# Patient Record
Sex: MALE | Race: WHITE | NOT HISPANIC OR LATINO
[De-identification: names, ages, dates, MRNs, and addresses within clinical notes are randomized per-mention and may not be internally consistent; named-entity substitution may affect disease eponyms.]

---

## 2019-06-19 ENCOUNTER — TRANSCRIPTION ENCOUNTER (OUTPATIENT)
Age: 54
End: 2019-06-19

## 2019-06-20 ENCOUNTER — EMERGENCY (EMERGENCY)
Facility: HOSPITAL | Age: 54
LOS: 1 days | Discharge: HOME | End: 2019-06-20
Attending: EMERGENCY MEDICINE
Payer: COMMERCIAL

## 2019-06-20 VITALS
WEIGHT: 182.98 LBS | TEMPERATURE: 98 F | SYSTOLIC BLOOD PRESSURE: 146 MMHG | RESPIRATION RATE: 16 BRPM | HEART RATE: 52 BPM | HEIGHT: 72 IN | OXYGEN SATURATION: 100 % | DIASTOLIC BLOOD PRESSURE: 81 MMHG

## 2019-06-20 VITALS
SYSTOLIC BLOOD PRESSURE: 133 MMHG | DIASTOLIC BLOOD PRESSURE: 77 MMHG | OXYGEN SATURATION: 98 % | TEMPERATURE: 98 F | HEART RATE: 54 BPM | RESPIRATION RATE: 18 BRPM

## 2019-06-20 PROCEDURE — 73130 X-RAY EXAM OF HAND: CPT

## 2019-06-20 PROCEDURE — 99284 EMERGENCY DEPT VISIT MOD MDM: CPT

## 2019-06-20 PROCEDURE — 73110 X-RAY EXAM OF WRIST: CPT

## 2019-06-20 PROCEDURE — 73110 X-RAY EXAM OF WRIST: CPT | Mod: 26,RT

## 2019-06-20 PROCEDURE — 73130 X-RAY EXAM OF HAND: CPT | Mod: 26,RT

## 2019-06-20 RX ORDER — OXYCODONE HYDROCHLORIDE 5 MG/1
1 TABLET ORAL
Qty: 12 | Refills: 0
Start: 2019-06-20 | End: 2019-06-22

## 2019-06-20 RX ORDER — ACETAMINOPHEN 500 MG
975 TABLET ORAL ONCE
Refills: 0 | Status: COMPLETED | OUTPATIENT
Start: 2019-06-20 | End: 2019-06-20

## 2019-06-20 RX ORDER — IBUPROFEN 200 MG
600 TABLET ORAL ONCE
Refills: 0 | Status: COMPLETED | OUTPATIENT
Start: 2019-06-20 | End: 2019-06-20

## 2019-06-20 RX ADMIN — Medication 600 MILLIGRAM(S): at 17:03

## 2019-06-20 RX ADMIN — Medication 975 MILLIGRAM(S): at 17:03

## 2019-06-20 NOTE — ED PROVIDER NOTE - PROGRESS NOTE DETAILS
Patient seen and evaluated by hand who performed bedside reduction and splinting. States pt to call office tomorrow and will be scheduled for surgery by next week. Patient aware. Will send short script oxy for breakthrough pain   Deepthi Tapia PA-C

## 2019-06-20 NOTE — ED PROVIDER NOTE - CLINICAL SUMMARY MEDICAL DECISION MAKING FREE TEXT BOX
Nemes - 52yo M, R hand dominant with no sig pmhx presents to ED c/o hand pain/swelling after hitting against wall 1 hour PTA. Denies numbness, tingling, weakness, other injuries. VS wnl, diffuse edema and deformity to dorsal aspect of the R hand, base of 4th and 5th MC. NV intact, decreased ROM. Likely boxers fx. Will treat pain, apply ice, get Xrays.

## 2019-06-20 NOTE — ED PROVIDER NOTE - CARE PROVIDER_API CALL
Santiago Allen (MD)  Plastic Surgery; Surgery; Surgical Critical Care  [address] [place] [address]  Phone: [phone]  Fax: [phone]  Follow Up Time:

## 2019-06-20 NOTE — ED PROVIDER NOTE - OBJECTIVE STATEMENT
53 year old male right hand dominant with no sig pmhx presents to ED c/o hand pain after hitting against wall 1 hour PTA. Patient states he had immediate pain and swelling to the area. Has been able to range digits 1-5 as well as wrist. Denies numbness, tingling, weakness, other injuries

## 2019-06-20 NOTE — PROGRESS NOTE ADULT - SUBJECTIVE AND OBJECTIVE BOX
Right 4th and 5th carpal-metacarpal fracture/dislocation  Reduced in ER under block  Unstable    Will need Percutaneous pinning  SPlint applied will schedule elective surgery  FU with office tomorrow [phone]

## 2019-06-20 NOTE — ED PROVIDER NOTE - PHYSICAL EXAMINATION
CONSTITUTIONAL: Patient is awake, alert and oriented x 3. Patient is well appearing and in no acute distress.  HEAD: NCAT,  NECK: supple, No LAD  LUNGS: CTA B/L,  HEART: RRR.+S1S2 no murmurs,   ABDOMEN: Soft nd/nt+bs no rebound or guarding.   EXTREMITY: FROM upper and lower ext b/l RIGHT HAND/WRIST: No deformity or ttp wrist. There is deformity with swelling at the base of the 4-5 metacarpals w/ ttp. FROM digits 1-5, 2+ radial pulse, normal gross sensation   SKIN: with no rash or lesions.   NEURO: No focal deficits

## 2019-06-20 NOTE — ED ADULT NURSE NOTE - OBJECTIVE STATEMENT
Male 53 years old came in for right hand pain and swelling after punching the wall today. Denies numbness and tingling of right hand. Radial pulse palpable. Fingers mobile. Waiting for hand Xray.

## 2019-06-20 NOTE — ED PROVIDER NOTE - NSFOLLOWUPINSTRUCTIONS_ED_ALL_ED_FT
1. Rest. Apply cold pack for 20 minutes multiple times daily. Elevate. Keep splint clean, dry, and in place.  2. For pain: Take Motrin 600mg every 6 hours alternated with Tylenol 1g every 6 hours. For severe pain take Oxycodone 1 tab every 6 hours as needed ( This medication causes sedation do not drive when taking)   3. Follow up with Dr. Allen. Call office tomorrow [phone] to schedule elective surgery likely by Wednesday   4. Return to ER for new or worsened symptoms including severe pain, swelling, numbness/tingling, bluish discoloration or any concern.

## 2019-06-20 NOTE — ED ADULT NURSE NOTE - CHPI ED NUR SYMPTOMS NEG
no fever/no numbness/no deformity/no back pain/no bruising/no weakness/no stiffness/no tingling/no abrasion

## 2019-06-24 ENCOUNTER — OUTPATIENT (OUTPATIENT)
Dept: OUTPATIENT SERVICES | Facility: HOSPITAL | Age: 54
LOS: 1 days | End: 2019-06-24
Payer: COMMERCIAL

## 2019-06-24 VITALS
HEART RATE: 37 BPM | DIASTOLIC BLOOD PRESSURE: 82 MMHG | SYSTOLIC BLOOD PRESSURE: 134 MMHG | RESPIRATION RATE: 18 BRPM | TEMPERATURE: 99 F | WEIGHT: 182.98 LBS | HEIGHT: 72 IN | OXYGEN SATURATION: 100 %

## 2019-06-24 DIAGNOSIS — S63.054A DISLOCATION OF OTHER CARPOMETACARPAL JOINT OF RIGHT HAND, INITIAL ENCOUNTER: ICD-10-CM

## 2019-06-24 DIAGNOSIS — S62.109A FRACTURE OF UNSPECIFIED CARPAL BONE, UNSPECIFIED WRIST, INITIAL ENCOUNTER FOR CLOSED FRACTURE: ICD-10-CM

## 2019-06-24 DIAGNOSIS — S62.309A UNSPECIFIED FRACTURE OF UNSPECIFIED METACARPAL BONE, INITIAL ENCOUNTER FOR CLOSED FRACTURE: ICD-10-CM

## 2019-06-24 PROCEDURE — G0463: CPT

## 2019-06-24 PROCEDURE — 93005 ELECTROCARDIOGRAM TRACING: CPT

## 2019-06-24 PROCEDURE — 93010 ELECTROCARDIOGRAM REPORT: CPT

## 2019-06-24 RX ORDER — CEFAZOLIN SODIUM 1 G
2000 VIAL (EA) INJECTION ONCE
Refills: 0 | Status: DISCONTINUED | OUTPATIENT
Start: 2019-06-26 | End: 2019-07-11

## 2019-06-24 RX ORDER — LIDOCAINE HCL 20 MG/ML
0.2 VIAL (ML) INJECTION ONCE
Refills: 0 | Status: DISCONTINUED | OUTPATIENT
Start: 2019-06-26 | End: 2019-07-11

## 2019-06-24 RX ORDER — CHLORHEXIDINE GLUCONATE 213 G/1000ML
1 SOLUTION TOPICAL DAILY
Refills: 0 | Status: DISCONTINUED | OUTPATIENT
Start: 2019-06-26 | End: 2019-07-11

## 2019-06-24 RX ORDER — SODIUM CHLORIDE 9 MG/ML
3 INJECTION INTRAMUSCULAR; INTRAVENOUS; SUBCUTANEOUS EVERY 8 HOURS
Refills: 0 | Status: DISCONTINUED | OUTPATIENT
Start: 2019-06-26 | End: 2019-07-11

## 2019-06-24 NOTE — H&P PST ADULT - ATTENDING COMMENTS
Right 4th and 5th metacarpal carpal dislocations and fractures  Plan for reduction and percutaneous pinning  RBA reviewed including bleeding infection scarring stiffness arthritis malunion nonunion and need for additional procedures  Consent obtained

## 2019-06-24 NOTE — H&P PST ADULT - MUSCULOSKELETAL COMMENTS
splint intact to right hand, +CSM ecchymosis of fingers, +CSM, swelling has come down as per patient

## 2019-06-24 NOTE — H&P PST ADULT - HISTORY OF PRESENT ILLNESS
52 y/o M no significant PMH sustained fracture right 4th and fifth carpal metacarpal fracture after hitting a wall 5 days ago, hand was splinted.  Presents today for closed reduction percutaneous pinning of right 4th and 5th carpal metacarpal fracture.  Of note, patient has asymptomatic bradycardia in the 30's as per baseline, he does "extreme cardio and weight lifting," PMD has comparison EKG to the one done today in PST.

## 2019-06-24 NOTE — H&P PST ADULT - NSICDXPROBLEM_GEN_ALL_CORE_FT
PROBLEM DIAGNOSES  Problem: Carpal fracture  Assessment and Plan: Closed reduction percutaneous pinning right 4th and 5th carpal metacarpal fracture

## 2019-06-24 NOTE — H&P PST ADULT - NSANTHOSAYNRD_GEN_A_CORE
No. ANUJA screening performed.  STOP BANG Legend: 0-2 = LOW Risk; 3-4 = INTERMEDIATE Risk; 5-8 = HIGH Risk

## 2019-06-25 ENCOUNTER — TRANSCRIPTION ENCOUNTER (OUTPATIENT)
Age: 54
End: 2019-06-25

## 2019-06-25 RX ORDER — ONDANSETRON 8 MG/1
4 TABLET, FILM COATED ORAL ONCE
Refills: 0 | Status: DISCONTINUED | OUTPATIENT
Start: 2019-06-26 | End: 2019-07-11

## 2019-06-25 RX ORDER — CELECOXIB 200 MG/1
200 CAPSULE ORAL ONCE
Refills: 0 | Status: DISCONTINUED | OUTPATIENT
Start: 2019-06-26 | End: 2019-07-11

## 2019-06-25 RX ORDER — SODIUM CHLORIDE 9 MG/ML
1000 INJECTION, SOLUTION INTRAVENOUS
Refills: 0 | Status: DISCONTINUED | OUTPATIENT
Start: 2019-06-26 | End: 2019-07-11

## 2019-06-25 RX ORDER — OXYCODONE HYDROCHLORIDE 5 MG/1
5 TABLET ORAL ONCE
Refills: 0 | Status: DISCONTINUED | OUTPATIENT
Start: 2019-06-26 | End: 2019-06-26

## 2019-06-26 ENCOUNTER — OUTPATIENT (OUTPATIENT)
Dept: OUTPATIENT SERVICES | Facility: HOSPITAL | Age: 54
LOS: 1 days | End: 2019-06-26
Payer: COMMERCIAL

## 2019-06-26 VITALS
OXYGEN SATURATION: 98 % | SYSTOLIC BLOOD PRESSURE: 116 MMHG | TEMPERATURE: 99 F | HEIGHT: 72 IN | DIASTOLIC BLOOD PRESSURE: 75 MMHG | HEART RATE: 46 BPM | WEIGHT: 182.98 LBS | RESPIRATION RATE: 16 BRPM

## 2019-06-26 VITALS
DIASTOLIC BLOOD PRESSURE: 56 MMHG | RESPIRATION RATE: 16 BRPM | OXYGEN SATURATION: 100 % | TEMPERATURE: 98 F | SYSTOLIC BLOOD PRESSURE: 116 MMHG | HEART RATE: 44 BPM

## 2019-06-26 DIAGNOSIS — S63.054A DISLOCATION OF OTHER CARPOMETACARPAL JOINT OF RIGHT HAND, INITIAL ENCOUNTER: ICD-10-CM

## 2019-06-26 DIAGNOSIS — S62.309A UNSPECIFIED FRACTURE OF UNSPECIFIED METACARPAL BONE, INITIAL ENCOUNTER FOR CLOSED FRACTURE: ICD-10-CM

## 2019-06-26 PROCEDURE — C1713: CPT

## 2019-06-26 PROCEDURE — 76000 FLUOROSCOPY <1 HR PHYS/QHP: CPT

## 2019-06-26 PROCEDURE — 26608 TREAT METACARPAL FRACTURE: CPT | Mod: F8

## 2019-06-26 RX ORDER — ACETAMINOPHEN 500 MG
1000 TABLET ORAL ONCE
Refills: 0 | Status: COMPLETED | OUTPATIENT
Start: 2019-06-26 | End: 2019-06-26

## 2019-06-26 RX ORDER — CELECOXIB 200 MG/1
200 CAPSULE ORAL ONCE
Refills: 0 | Status: COMPLETED | OUTPATIENT
Start: 2019-06-26 | End: 2019-06-26

## 2019-06-26 RX ADMIN — CELECOXIB 200 MILLIGRAM(S): 200 CAPSULE ORAL at 10:36

## 2019-06-26 RX ADMIN — Medication 1000 MILLIGRAM(S): at 10:35

## 2019-06-26 NOTE — ASU DISCHARGE PLAN (ADULT/PEDIATRIC) - CARE PROVIDER_API CALL
Santiago Allen (MD)  Plastic Surgery; Surgery; Surgical Critical Care  [address] [place] [address]  Phone: [phone]  Fax: [phone]  Follow Up Time:

## 2019-06-26 NOTE — ASU DISCHARGE PLAN (ADULT/PEDIATRIC) - CALL YOUR DOCTOR IF YOU HAVE ANY OF THE FOLLOWING:
Pain not relieved by Medications/Bleeding that does not stop Pain not relieved by Medications/Unable to urinate/Bleeding that does not stop

## 2019-06-26 NOTE — ASU DISCHARGE PLAN (ADULT/PEDIATRIC) - ASU DC SPECIAL INSTRUCTIONSFT
Keep splint in place at all times.  Wrap hand in plastic bag to protect splint and keep it dry while bathing.  Take antibiotics as prescribed.  Take pain medication as needed.     Ensure you keep your right hand elevated.  Please follow up with Dr. Allen within 1 week after discharge from the hospital. You may call [phone] to schedule an appointment.

## 2019-06-26 NOTE — BRIEF OPERATIVE NOTE - NSICDXBRIEFPROCEDURE_GEN_ALL_CORE_FT
PROCEDURES:  Closed reduction, fracture, metacarpal bone, with pinning 26-Jun-2019 12:35:55  Tana Fisher E

## 2021-01-13 PROBLEM — Z86.79 PERSONAL HISTORY OF OTHER DISEASES OF THE CIRCULATORY SYSTEM: Chronic | Status: ACTIVE | Noted: 2019-06-24

## 2021-01-15 ENCOUNTER — APPOINTMENT (OUTPATIENT)
Dept: ORTHOPEDIC SURGERY | Facility: CLINIC | Age: 56
End: 2021-01-15
Payer: COMMERCIAL

## 2021-01-15 VITALS
DIASTOLIC BLOOD PRESSURE: 80 MMHG | WEIGHT: 185 LBS | BODY MASS INDEX: 25.06 KG/M2 | HEART RATE: 53 BPM | HEIGHT: 72 IN | OXYGEN SATURATION: 98 % | SYSTOLIC BLOOD PRESSURE: 131 MMHG

## 2021-01-15 DIAGNOSIS — Z78.9 OTHER SPECIFIED HEALTH STATUS: ICD-10-CM

## 2021-01-15 DIAGNOSIS — M25.511 PAIN IN RIGHT SHOULDER: ICD-10-CM

## 2021-01-15 DIAGNOSIS — M75.41 IMPINGEMENT SYNDROME OF RIGHT SHOULDER: ICD-10-CM

## 2021-01-15 PROBLEM — Z00.00 ENCOUNTER FOR PREVENTIVE HEALTH EXAMINATION: Status: ACTIVE | Noted: 2021-01-15

## 2021-01-15 PROCEDURE — 73030 X-RAY EXAM OF SHOULDER: CPT | Mod: RT

## 2021-01-15 PROCEDURE — 99072 ADDL SUPL MATRL&STAF TM PHE: CPT

## 2021-01-15 PROCEDURE — 99204 OFFICE O/P NEW MOD 45 MIN: CPT

## 2021-01-15 RX ORDER — DICLOFENAC SODIUM 75 MG/1
75 TABLET, DELAYED RELEASE ORAL
Qty: 60 | Refills: 0 | Status: ACTIVE | COMMUNITY
Start: 2021-01-15 | End: 1900-01-01

## 2021-01-15 NOTE — PHYSICAL EXAM
[de-identified] : Physical examination of the right shoulder discloses tenderness to overhead motion at 170 degrees increase with abduction and external rotation.  Positive impingement sign. [de-identified] : X-rays taken of the right shoulder and AP transscapular lateral and axillary views are within normal limits.

## 2021-01-15 NOTE — DISCUSSION/SUMMARY
[de-identified] : The patient was advised of his findings.  He was felt to have an impingement syndrome and was asked to avoid overhead weight lifting and sports until his symptoms subside.  He will start diclofenac and was offered referral to physical therapy and a cortisone injection both of which she declined.  Follow-up as needed

## 2021-01-15 NOTE — HISTORY OF PRESENT ILLNESS
[Worsening] : worsening [___ yrs] : [unfilled] year(s) ago [Intermit.] : ~He/She~ states the symptoms seem to be intermittent [Lifting] : worsened by lifting [Rest] : relieved by rest [5] : a current pain level of 5/10 [0] : a minimum pain level of 0/10 [9] : a maximum pain level of 9/10 [de-identified] : Pt presents for initial evaluation with pain in his right shoulder, pt is right hand dominant. Pt has no known injury, pt has no radiating pain to the upper right extremity. He has not taken any pain medication. Pt has exercised in gym. [de-identified] : certain movements

## 2021-03-12 ENCOUNTER — RESULT REVIEW (OUTPATIENT)
Age: 56
End: 2021-03-12

## 2021-11-11 ENCOUNTER — NON-APPOINTMENT (OUTPATIENT)
Age: 56
End: 2021-11-11

## 2021-11-16 ENCOUNTER — APPOINTMENT (OUTPATIENT)
Dept: CARDIOLOGY | Facility: CLINIC | Age: 56
End: 2021-11-16
Payer: COMMERCIAL

## 2021-11-16 ENCOUNTER — NON-APPOINTMENT (OUTPATIENT)
Age: 56
End: 2021-11-16

## 2021-11-16 VITALS
WEIGHT: 176 LBS | SYSTOLIC BLOOD PRESSURE: 131 MMHG | TEMPERATURE: 96.3 F | RESPIRATION RATE: 12 BRPM | HEART RATE: 44 BPM | BODY MASS INDEX: 23.84 KG/M2 | HEIGHT: 72 IN | OXYGEN SATURATION: 100 % | DIASTOLIC BLOOD PRESSURE: 73 MMHG

## 2021-11-16 PROCEDURE — 99244 OFF/OP CNSLTJ NEW/EST MOD 40: CPT | Mod: 25

## 2021-11-16 PROCEDURE — 93000 ELECTROCARDIOGRAM COMPLETE: CPT

## 2021-11-16 NOTE — PHYSICAL EXAM
[Well Developed] : well developed [Well Nourished] : well nourished [No Acute Distress] : no acute distress [Normal] : normal conjunctiva [Normal Venous Pressure] : normal venous pressure [No Carotid Bruit] : no carotid bruit [Bradycardia] : bradycardic [Rhythm Regular] : regular [Normal S1] : normal S1 [Normal S2] : normal S2 [S3] : no S3 [No Murmur] : no murmurs heard [No Pitting Edema] : no pitting edema present [Right Carotid Bruit] : no bruit heard over the right carotid [Left Carotid Bruit] : no bruit heard over the left carotid [Bruit] : no bruit heard [Clear Lung Fields] : clear lung fields [Good Air Entry] : good air entry [No Respiratory Distress] : no respiratory distress  [Soft] : abdomen soft [Non Tender] : non-tender [Normal Bowel Sounds] : normal bowel sounds [Normal Gait] : normal gait [Gait - Sufficient for Exercise Testing] : gait - sufficient for exercise testing [No Edema] : no edema [No Cyanosis] : no cyanosis [No Clubbing] : no clubbing [No Rash] : no rash [Alert and Oriented] : alert and oriented [Normal memory] : normal memory [de-identified] : Extraocular muscles intact. Anicteric sclerae. [de-identified] : He was wearing a face mask during the examination. [de-identified] : No visible skin ulcers.

## 2021-11-16 NOTE — CARDIOLOGY SUMMARY
[de-identified] : 11/16/2021: Marked Sinus Bradycardia at 42 beats per minute. [de-identified] : Calcium score performed on 9/13/2021: Total calcium score of 428. LAD= 329; LCx/ OM= 99

## 2021-11-16 NOTE — HISTORY OF PRESENT ILLNESS
[FreeTextEntry1] : Patient is a 56 year old man with a family history of CAD and newly diagnosed coronary atherosclerosis who presents today for evaluation of elevated calcium score. He states that he has been feeling well denying any chest pain, dyspnea, palpitations, headaches, and dizziness. He states that he exercises on a daily basis and does not experience any exertional symptoms. He states that he has been doing high intensity exercises for many years. He also states that he used to consume a diet high in protein and is unsure if this contributed to his elevated calcium score.

## 2021-11-16 NOTE — DISCUSSION/SUMMARY
[FreeTextEntry1] : IMPRESSION: Mr. WHALEN is a 56 year old man with a family history of CAD and newly diagnosed coronary atherosclerosis who presents today for evaluation of elevated calcium score. \par \par PLAN:\par 1. He is asymptomatic, however, he has a high calcium score. I will be checking a CMP and lipid profile today. I have asked him to start on ASA 81 mg daily given his coronary atherosclerosis. His goal LDL is less than 70. He will start on Crestor 10 mg daily. I will also be checking a CRP. Apolipoprotein levels, and a Lipoprotein A level.\par 2. I have asked him to schedule an echocardiogram given his abnormal ECG. He is bradycardic, but asymptomatic. His bradycardia may be related to his excellent conditioning that he describes.\par 3. I will try to get a Cardiac CT approved to evaluate the extent of his CAD given his elevated calcium score.\par 4. He will follow up with me after his cardiac tests have been performed.

## 2021-11-17 LAB
BASOPHILS # BLD AUTO: 0.03 K/UL
BASOPHILS NFR BLD AUTO: 0.6 %
EOSINOPHIL # BLD AUTO: 0.11 K/UL
EOSINOPHIL NFR BLD AUTO: 2 %
ESTIMATED AVERAGE GLUCOSE: 97 MG/DL
HBA1C MFR BLD HPLC: 5 %
HCT VFR BLD CALC: 45.4 %
HGB BLD-MCNC: 14.4 G/DL
IMM GRANULOCYTES NFR BLD AUTO: 0.2 %
LYMPHOCYTES # BLD AUTO: 1.64 K/UL
LYMPHOCYTES NFR BLD AUTO: 30.4 %
MAN DIFF?: NORMAL
MCHC RBC-ENTMCNC: 29.3 PG
MCHC RBC-ENTMCNC: 31.7 GM/DL
MCV RBC AUTO: 92.3 FL
MONOCYTES # BLD AUTO: 0.27 K/UL
MONOCYTES NFR BLD AUTO: 5 %
NEUTROPHILS # BLD AUTO: 3.33 K/UL
NEUTROPHILS NFR BLD AUTO: 61.8 %
PLATELET # BLD AUTO: 182 K/UL
RBC # BLD: 4.92 M/UL
RBC # FLD: 13.3 %
WBC # FLD AUTO: 5.39 K/UL

## 2021-11-23 ENCOUNTER — APPOINTMENT (OUTPATIENT)
Dept: CARDIOLOGY | Facility: CLINIC | Age: 56
End: 2021-11-23
Payer: COMMERCIAL

## 2021-11-23 LAB
25(OH)D3 SERPL-MCNC: 38.1 NG/ML
TSH SERPL-ACNC: 3.06 UIU/ML

## 2021-11-23 PROCEDURE — 93306 TTE W/DOPPLER COMPLETE: CPT

## 2021-11-24 LAB
ALBUMIN SERPL ELPH-MCNC: 4.6 G/DL
ALP BLD-CCNC: 59 U/L
ALT SERPL-CCNC: 29 U/L
ANION GAP SERPL CALC-SCNC: 19 MMOL/L
APO A-I SERPL-MCNC: 126 MG/DL
APO A-I/APO B SERPL: 0.53 RATIO
APO B SERPL-MCNC: 66 MG/DL
APO LP(A) SERPL-MCNC: 112.8 NMOL/L
AST SERPL-CCNC: 31 U/L
BILIRUB SERPL-MCNC: 0.4 MG/DL
BUN SERPL-MCNC: 14 MG/DL
CALCIUM SERPL-MCNC: 9.7 MG/DL
CHLORIDE SERPL-SCNC: 102 MMOL/L
CHOLEST SERPL-MCNC: 130 MG/DL
CO2 SERPL-SCNC: 22 MMOL/L
CREAT SERPL-MCNC: 0.94 MG/DL
CRP SERPL HS-MCNC: 0.3 MG/L
GLUCOSE SERPL-MCNC: 83 MG/DL
HDLC SERPL-MCNC: 44 MG/DL
LDLC SERPL CALC-MCNC: 71 MG/DL
NONHDLC SERPL-MCNC: 86 MG/DL
POTASSIUM SERPL-SCNC: 5.5 MMOL/L
PROT SERPL-MCNC: 7 G/DL
SODIUM SERPL-SCNC: 143 MMOL/L
TRIGL SERPL-MCNC: 75 MG/DL

## 2021-12-14 ENCOUNTER — APPOINTMENT (OUTPATIENT)
Dept: CT IMAGING | Facility: CLINIC | Age: 56
End: 2021-12-14

## 2021-12-23 ENCOUNTER — TRANSCRIPTION ENCOUNTER (OUTPATIENT)
Age: 56
End: 2021-12-23

## 2022-01-18 ENCOUNTER — RX RENEWAL (OUTPATIENT)
Age: 57
End: 2022-01-18

## 2022-02-08 ENCOUNTER — NON-APPOINTMENT (OUTPATIENT)
Age: 57
End: 2022-02-08

## 2022-02-08 ENCOUNTER — TRANSCRIPTION ENCOUNTER (OUTPATIENT)
Age: 57
End: 2022-02-08

## 2022-02-08 ENCOUNTER — APPOINTMENT (OUTPATIENT)
Dept: CARDIOLOGY | Facility: CLINIC | Age: 57
End: 2022-02-08
Payer: COMMERCIAL

## 2022-02-08 VITALS
RESPIRATION RATE: 12 BRPM | WEIGHT: 170 LBS | HEART RATE: 42 BPM | OXYGEN SATURATION: 100 % | HEIGHT: 72 IN | SYSTOLIC BLOOD PRESSURE: 136 MMHG | BODY MASS INDEX: 23.03 KG/M2 | DIASTOLIC BLOOD PRESSURE: 77 MMHG | TEMPERATURE: 96.3 F

## 2022-02-08 PROCEDURE — 93000 ELECTROCARDIOGRAM COMPLETE: CPT

## 2022-02-08 PROCEDURE — 99214 OFFICE O/P EST MOD 30 MIN: CPT | Mod: 25

## 2022-02-08 RX ORDER — ASPIRIN ENTERIC COATED TABLETS 81 MG 81 MG/1
81 TABLET, DELAYED RELEASE ORAL
Refills: 0 | Status: ACTIVE | COMMUNITY
Start: 2022-02-08

## 2022-02-09 LAB
ALBUMIN SERPL ELPH-MCNC: 4.6 G/DL
ALP BLD-CCNC: 71 U/L
ALT SERPL-CCNC: 34 U/L
ANION GAP SERPL CALC-SCNC: 10 MMOL/L
AST SERPL-CCNC: 37 U/L
BILIRUB SERPL-MCNC: 0.6 MG/DL
BUN SERPL-MCNC: 13 MG/DL
CALCIUM SERPL-MCNC: 9.5 MG/DL
CHLORIDE SERPL-SCNC: 101 MMOL/L
CHOLEST SERPL-MCNC: 111 MG/DL
CO2 SERPL-SCNC: 28 MMOL/L
CREAT SERPL-MCNC: 0.85 MG/DL
GLUCOSE SERPL-MCNC: 81 MG/DL
HDLC SERPL-MCNC: 44 MG/DL
LDLC SERPL CALC-MCNC: 50 MG/DL
NONHDLC SERPL-MCNC: 67 MG/DL
POTASSIUM SERPL-SCNC: 4.8 MMOL/L
PROT SERPL-MCNC: 7 G/DL
SODIUM SERPL-SCNC: 140 MMOL/L
TRIGL SERPL-MCNC: 83 MG/DL

## 2022-02-13 NOTE — DISCUSSION/SUMMARY
[FreeTextEntry1] : IMPRESSION: Mr. WHALEN is a 56 year old man with a family history of CAD, dyslipidemia, and coronary atherosclerosis who presents today for follow up of hyperlipidemia and CAD. \par \par PLAN:\par 1. He will continue on Crestor 10 mg daily and Aspirin 81 mg daily given his CAD.  I will be checking a CMP and lipid profile today. His goal LDL is less than 70. He will also continue on a low fat/ low cholesterol diet. He had nonobstructive coronary artery disease on his cardiac CT that was performed about 2 months ago. He was bradycardic on his ECG that was performed today, which is relatively unchanged and he is without any symptoms.\par 2. He should have a repeat echocardiogram in about a year to follow up his mitral and tricuspid regurgitation.\par 3. He will follow up with me in 3 months, or sooner if he is symptomatic.\par 4. I spent approximately 34 minutes with the patient during this encounter, including documentation.

## 2022-02-13 NOTE — PHYSICAL EXAM
[Well Developed] : well developed [Well Nourished] : well nourished [No Acute Distress] : no acute distress [Normal Venous Pressure] : normal venous pressure [No Carotid Bruit] : no carotid bruit [Bradycardia] : bradycardic [Rhythm Regular] : regular [Normal S1] : normal S1 [Normal S2] : normal S2 [No Pitting Edema] : no pitting edema present [Clear Lung Fields] : clear lung fields [Good Air Entry] : good air entry [No Respiratory Distress] : no respiratory distress  [Soft] : abdomen soft [Non Tender] : non-tender [Normal Bowel Sounds] : normal bowel sounds [Normal Gait] : normal gait [Gait - Sufficient for Exercise Testing] : gait - sufficient for exercise testing [No Edema] : no edema [No Cyanosis] : no cyanosis [No Rash] : no rash [Alert and Oriented] : alert and oriented [Normal memory] : normal memory [Normal Conjunctiva] : normal conjunctiva [S3] : no S3 [No Murmur] : no murmurs heard [Right Carotid Bruit] : no bruit heard over the right carotid [Left Carotid Bruit] : no bruit heard over the left carotid [Bruit] : no bruit heard [Moves all extremities] : moves all extremities [No Focal Deficits] : no focal deficits [Normal Speech] : normal speech [de-identified] : Extraocular muscles intact. Anicteric sclerae. [de-identified] : He was wearing a face mask during the examination. [de-identified] : No visible skin ulcers.

## 2022-02-13 NOTE — CARDIOLOGY SUMMARY
[de-identified] : 2/8/2022: Marked Sinus Bradycardia at 41 beats per minute.\par  [de-identified] : 11/23/2021: EF 55%. 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 Mild-moderate, eccentric anteriorly-directed mitral regurgitation. Mild aortic regurgitation. Mildly dilated left atrium. Normal LV systolic function. MIld right atrial enlargement. RV enlargement with normal RV systolic function. Borderline pulmonary HTN. PASP = 39 mmHg. Moderate tricuspid regurgitation.\par  [de-identified] : Calcium score performed on 9/13/2021: Total calcium score of 428. LAD= 329; LCx/ OM= 99\par \par Cardiac CT performed on 12/16/2021: Calcium Score = 457. Calcified plaque of the distal left main resulting in 10-15% stenosis. 30-40% nonobstructive plaque in the proximal LAD. Less than 20% stenosis of the distal LAD. Noncalcified plaque at the ostium of the first diagonal resulting in 25-35% stenosis. Small focus of calcified plaque of the proximal LCx resulting in no appreciable stenosis. Calcified plaque of the proximal RCA resulting in less than 10% stenosis. \par

## 2022-02-13 NOTE — HISTORY OF PRESENT ILLNESS
[FreeTextEntry1] : Patient is a 56 year old man with a family history of CAD, dyslipidemia, and coronary atherosclerosis who presents today for follow up of coronary atherosclerosis and dyslipidemia.  He states that he has been feeling well denying any chest pain, dyspnea, palpitations, headaches, and dizziness. He states that he exercises on a daily basis and does not experience any exertional symptoms. He has been watching his diet very closely and eliminating the amount of oils and meat in his diet. He has not experienced any side effects to the Rosuvastatin.

## 2022-05-11 ENCOUNTER — NON-APPOINTMENT (OUTPATIENT)
Age: 57
End: 2022-05-11

## 2022-05-11 ENCOUNTER — APPOINTMENT (OUTPATIENT)
Dept: CARDIOLOGY | Facility: CLINIC | Age: 57
End: 2022-05-11
Payer: COMMERCIAL

## 2022-05-11 VITALS
RESPIRATION RATE: 12 BRPM | SYSTOLIC BLOOD PRESSURE: 119 MMHG | DIASTOLIC BLOOD PRESSURE: 74 MMHG | HEART RATE: 45 BPM | HEIGHT: 72 IN | TEMPERATURE: 98 F | OXYGEN SATURATION: 98 % | BODY MASS INDEX: 23.7 KG/M2 | WEIGHT: 175 LBS

## 2022-05-11 PROCEDURE — 99214 OFFICE O/P EST MOD 30 MIN: CPT | Mod: 25

## 2022-05-11 PROCEDURE — 93000 ELECTROCARDIOGRAM COMPLETE: CPT

## 2022-05-14 NOTE — CARDIOLOGY SUMMARY
[de-identified] : 11/23/2021: EF 55%. 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 Mild-moderate, eccentric anteriorly-directed mitral regurgitation. Mild aortic regurgitation. Mildly dilated left atrium. Normal LV systolic function. MIld right atrial enlargement. RV enlargement with normal RV systolic function. Borderline pulmonary HTN. PASP = 39 mmHg. Moderate tricuspid regurgitation.\par  [de-identified] : 5/11/2022: Marked Sinus Bradycardia at 43 beats per minute, low voltage QRS, and nonspecific T wave abnormality.\par  [de-identified] : Calcium score performed on 9/13/2021: Total calcium score of 428. LAD= 329; LCx/ OM= 99\par \par Cardiac CT performed on 12/16/2021: Calcium Score = 457. Calcified plaque of the distal left main resulting in 10-15% stenosis. 30-40% nonobstructive plaque in the proximal LAD. Less than 20% stenosis of the distal LAD. Noncalcified plaque at the ostium of the first diagonal resulting in 25-35% stenosis. Small focus of calcified plaque of the proximal LCx resulting in no appreciable stenosis. Calcified plaque of the proximal RCA resulting in less than 10% stenosis. \par

## 2022-05-14 NOTE — DISCUSSION/SUMMARY
[FreeTextEntry1] : IMPRESSION: Mr. WHALEN is a 56 year old man with a family history of CAD, dyslipidemia, and coronary atherosclerosis who presents today for follow up of hyperlipidemia and CAD. \par \par PLAN:\par 1. He will continue on Crestor 10 mg daily and Aspirin 81 mg daily given his CAD.  I will be checking a CMP and lipid profile today. His goal LDL is less than 70. He will also continue on a low fat/ low cholesterol diet. He had nonobstructive coronary artery disease on his cardiac CT that was performed about 5 months ago. He was bradycardic on his ECG that was performed today, which is relatively unchanged and he is without any symptoms. I will also be checking his Apolipoproteins and Lipoprotein A level.\par 2. He should have a repeat echocardiogram in about 6 months to follow up his mitral and tricuspid regurgitation.\par 3. He will follow up with me in 4 months, or sooner if he is symptomatic.\par 4. I spent approximately 30 minutes with the patient during this encounter, including documentation.

## 2022-05-14 NOTE — PHYSICAL EXAM
[Well Developed] : well developed [Well Nourished] : well nourished [No Acute Distress] : no acute distress [Normal Conjunctiva] : normal conjunctiva [Normal Venous Pressure] : normal venous pressure [No Carotid Bruit] : no carotid bruit [Bradycardia] : bradycardic [Rhythm Regular] : regular [Normal S1] : normal S1 [Normal S2] : normal S2 [S3] : no S3 [No Murmur] : no murmurs heard [No Pitting Edema] : no pitting edema present [Right Carotid Bruit] : no bruit heard over the right carotid [Left Carotid Bruit] : no bruit heard over the left carotid [Bruit] : no bruit heard [Clear Lung Fields] : clear lung fields [Good Air Entry] : good air entry [No Respiratory Distress] : no respiratory distress  [Soft] : abdomen soft [Non Tender] : non-tender [Normal Bowel Sounds] : normal bowel sounds [Normal Gait] : normal gait [Gait - Sufficient for Exercise Testing] : gait - sufficient for exercise testing [No Edema] : no edema [No Cyanosis] : no cyanosis [No Rash] : no rash [Moves all extremities] : moves all extremities [No Focal Deficits] : no focal deficits [Normal Speech] : normal speech [Alert and Oriented] : alert and oriented [Normal memory] : normal memory [de-identified] : He was wearing a face mask during the examination. [de-identified] : Extraocular muscles intact. Anicteric sclerae. [de-identified] : No visible skin ulcers.

## 2022-05-14 NOTE — HISTORY OF PRESENT ILLNESS
[FreeTextEntry1] : Patient is a 56 year old man with a family history of CAD, dyslipidemia, and coronary atherosclerosis who presents today for follow up of coronary atherosclerosis and dyslipidemia.  He states that he has been feeling well denying any chest pain, dyspnea, palpitations, headaches, and dizziness. He states that he exercises on a daily basis and does not experience any exertional symptoms. He has been watching his diet very closely and has not experienced any side effects from Rosuvastatin.

## 2022-10-11 ENCOUNTER — TRANSCRIPTION ENCOUNTER (OUTPATIENT)
Age: 57
End: 2022-10-11

## 2022-10-12 ENCOUNTER — TRANSCRIPTION ENCOUNTER (OUTPATIENT)
Age: 57
End: 2022-10-12

## 2022-12-01 ENCOUNTER — NON-APPOINTMENT (OUTPATIENT)
Age: 57
End: 2022-12-01

## 2022-12-01 ENCOUNTER — APPOINTMENT (OUTPATIENT)
Dept: CARDIOLOGY | Facility: CLINIC | Age: 57
End: 2022-12-01

## 2022-12-01 VITALS
RESPIRATION RATE: 12 BRPM | BODY MASS INDEX: 23.03 KG/M2 | DIASTOLIC BLOOD PRESSURE: 72 MMHG | SYSTOLIC BLOOD PRESSURE: 119 MMHG | WEIGHT: 170 LBS | OXYGEN SATURATION: 98 % | HEIGHT: 72 IN | HEART RATE: 49 BPM | TEMPERATURE: 97.6 F

## 2022-12-01 LAB
25(OH)D3 SERPL-MCNC: 38.9 NG/ML
ALBUMIN SERPL ELPH-MCNC: 4.3 G/DL
ALP BLD-CCNC: 59 U/L
ALT SERPL-CCNC: 28 U/L
ANION GAP SERPL CALC-SCNC: 10 MMOL/L
APO A-I SERPL-MCNC: 122 MG/DL
APO A-I/APO B SERPL: 0.38 RATIO
APO B SERPL-MCNC: 47 MG/DL
APO LP(A) SERPL-MCNC: 128.3 NMOL/L
AST SERPL-CCNC: 30 U/L
BASOPHILS # BLD AUTO: 0.03 K/UL
BASOPHILS NFR BLD AUTO: 0.5 %
BILIRUB SERPL-MCNC: 0.5 MG/DL
BUN SERPL-MCNC: 15 MG/DL
CALCIUM SERPL-MCNC: 9.1 MG/DL
CHLORIDE SERPL-SCNC: 102 MMOL/L
CHOLEST SERPL-MCNC: 91 MG/DL
CO2 SERPL-SCNC: 28 MMOL/L
CREAT SERPL-MCNC: 0.84 MG/DL
CREAT SPEC-SCNC: 88 MG/DL
EGFR: 102 ML/MIN/1.73M2
EOSINOPHIL # BLD AUTO: 0.13 K/UL
EOSINOPHIL NFR BLD AUTO: 2.1 %
GLUCOSE SERPL-MCNC: 83 MG/DL
HCT VFR BLD CALC: 43 %
HDLC SERPL-MCNC: 40 MG/DL
HGB BLD-MCNC: 13.9 G/DL
IMM GRANULOCYTES NFR BLD AUTO: 0.2 %
LDLC SERPL CALC-MCNC: 39 MG/DL
LYMPHOCYTES # BLD AUTO: 1.84 K/UL
LYMPHOCYTES NFR BLD AUTO: 29.4 %
MAN DIFF?: NORMAL
MCHC RBC-ENTMCNC: 29.6 PG
MCHC RBC-ENTMCNC: 32.3 GM/DL
MCV RBC AUTO: 91.7 FL
MICROALBUMIN 24H UR DL<=1MG/L-MCNC: <1.2 MG/DL
MICROALBUMIN/CREAT 24H UR-RTO: NORMAL MG/G
MONOCYTES # BLD AUTO: 0.29 K/UL
MONOCYTES NFR BLD AUTO: 4.6 %
NEUTROPHILS # BLD AUTO: 3.95 K/UL
NEUTROPHILS NFR BLD AUTO: 63.2 %
NONHDLC SERPL-MCNC: 51 MG/DL
PLATELET # BLD AUTO: 184 K/UL
POTASSIUM SERPL-SCNC: 4.7 MMOL/L
PROT SERPL-MCNC: 6.6 G/DL
RBC # BLD: 4.69 M/UL
RBC # FLD: 13.2 %
SODIUM SERPL-SCNC: 140 MMOL/L
TRIGL SERPL-MCNC: 63 MG/DL
TSH SERPL-ACNC: 3.24 UIU/ML
WBC # FLD AUTO: 6.25 K/UL

## 2022-12-01 PROCEDURE — 93000 ELECTROCARDIOGRAM COMPLETE: CPT

## 2022-12-01 PROCEDURE — 99214 OFFICE O/P EST MOD 30 MIN: CPT | Mod: 25

## 2022-12-02 LAB
25(OH)D3 SERPL-MCNC: 36.8 NG/ML
ALBUMIN SERPL ELPH-MCNC: 4.4 G/DL
ALP BLD-CCNC: 53 U/L
ALT SERPL-CCNC: 28 U/L
ANION GAP SERPL CALC-SCNC: 10 MMOL/L
AST SERPL-CCNC: 30 U/L
BASOPHILS # BLD AUTO: 0.04 K/UL
BASOPHILS NFR BLD AUTO: 0.6 %
BILIRUB SERPL-MCNC: 0.4 MG/DL
BUN SERPL-MCNC: 18 MG/DL
CALCIUM SERPL-MCNC: 9.4 MG/DL
CHLORIDE SERPL-SCNC: 101 MMOL/L
CHOLEST SERPL-MCNC: 108 MG/DL
CO2 SERPL-SCNC: 27 MMOL/L
CREAT SERPL-MCNC: 0.82 MG/DL
EGFR: 102 ML/MIN/1.73M2
EOSINOPHIL # BLD AUTO: 0.13 K/UL
EOSINOPHIL NFR BLD AUTO: 2.1 %
GLUCOSE SERPL-MCNC: 82 MG/DL
HCT VFR BLD CALC: 44.5 %
HDLC SERPL-MCNC: 46 MG/DL
HGB BLD-MCNC: 15 G/DL
IMM GRANULOCYTES NFR BLD AUTO: 0.2 %
LDLC SERPL CALC-MCNC: 45 MG/DL
LYMPHOCYTES # BLD AUTO: 1.66 K/UL
LYMPHOCYTES NFR BLD AUTO: 26.8 %
MAN DIFF?: NORMAL
MCHC RBC-ENTMCNC: 29.8 PG
MCHC RBC-ENTMCNC: 33.7 GM/DL
MCV RBC AUTO: 88.5 FL
MONOCYTES # BLD AUTO: 0.33 K/UL
MONOCYTES NFR BLD AUTO: 5.3 %
NEUTROPHILS # BLD AUTO: 4.03 K/UL
NEUTROPHILS NFR BLD AUTO: 65 %
NONHDLC SERPL-MCNC: 62 MG/DL
PLATELET # BLD AUTO: 184 K/UL
POTASSIUM SERPL-SCNC: 4.6 MMOL/L
PROT SERPL-MCNC: 6.9 G/DL
RBC # BLD: 5.03 M/UL
RBC # FLD: 12.4 %
SODIUM SERPL-SCNC: 138 MMOL/L
TRIGL SERPL-MCNC: 85 MG/DL
TSH SERPL-ACNC: 3.71 UIU/ML
WBC # FLD AUTO: 6.2 K/UL

## 2022-12-02 NOTE — PHYSICAL EXAM
[Well Developed] : well developed [Well Nourished] : well nourished [No Acute Distress] : no acute distress [Normal Conjunctiva] : normal conjunctiva [Normal Venous Pressure] : normal venous pressure [No Carotid Bruit] : no carotid bruit [Bradycardia] : bradycardic [Rhythm Regular] : regular [Normal S1] : normal S1 [Normal S2] : normal S2 [No Pitting Edema] : no pitting edema present [Clear Lung Fields] : clear lung fields [Good Air Entry] : good air entry [No Respiratory Distress] : no respiratory distress  [Soft] : abdomen soft [Non Tender] : non-tender [Normal Bowel Sounds] : normal bowel sounds [Normal Gait] : normal gait [Gait - Sufficient for Exercise Testing] : gait - sufficient for exercise testing [No Edema] : no edema [No Cyanosis] : no cyanosis [No Rash] : no rash [Moves all extremities] : moves all extremities [No Focal Deficits] : no focal deficits [Normal Speech] : normal speech [Alert and Oriented] : alert and oriented [Normal memory] : normal memory [S3] : no S3 [I] : a grade 1 [Right Carotid Bruit] : no bruit heard over the right carotid [Left Carotid Bruit] : no bruit heard over the left carotid [Bruit] : no bruit heard [de-identified] : Extraocular muscles intact. Anicteric sclerae. [de-identified] : He was wearing a face mask during the examination. [de-identified] : No visible skin ulcers.

## 2022-12-02 NOTE — DISCUSSION/SUMMARY
[FreeTextEntry1] : IMPRESSION: Mr. WHALEN is a 57 year old man with a family history of CAD, dyslipidemia, and coronary atherosclerosis who presents today for follow up of hyperlipidemia and CAD. \par \par PLAN:\par 1. He will continue on Crestor 10 mg daily and Aspirin 81 mg daily given his CAD.  I will be checking a CMP and lipid profile today. His goal LDL is less than 70. He will also continue on a low fat/ low cholesterol diet. He had nonobstructive coronary artery disease on his cardiac CT that was performed about 1 year ago. He was bradycardic on his ECG that was performed today, which is relatively unchanged and he is without any symptoms. \par 2. I have asked him to schedule an echocardiogram at the time of his next visit in about 6 months to follow up his mitral and tricuspid regurgitation.\par 3. He will follow up with me in 6 months, or sooner if he is symptomatic. [EKG obtained to assist in diagnosis and management of assessed problem(s)] : EKG obtained to assist in diagnosis and management of assessed problem(s)

## 2022-12-02 NOTE — HISTORY OF PRESENT ILLNESS
[FreeTextEntry1] : Patient is a 57 year old man with a family history of CAD, dyslipidemia, mitral regurgitation, and coronary atherosclerosis who presents today for follow up of coronary atherosclerosis and dyslipidemia.  He states that he has been feeling well denying any chest pain, dyspnea, palpitations, headaches, and dizziness. He states that he exercises on a daily basis and does not experience any exertional symptoms. He has been watching his diet very closely and has not experienced any side effects from Rosuvastatin.

## 2022-12-02 NOTE — CARDIOLOGY SUMMARY
[de-identified] : 12/1/2022: Marked Sinus Bradycardia at 45 beats per minute [de-identified] : 11/23/2021: EF 55%. 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 Mild-moderate, eccentric anteriorly-directed mitral regurgitation. Mild aortic regurgitation. Mildly dilated left atrium. Normal LV systolic function. MIld right atrial enlargement. RV enlargement with normal RV systolic function. Borderline pulmonary HTN. PASP = 39 mmHg. Moderate tricuspid regurgitation.\par  [de-identified] : Calcium score performed on 9/13/2021: Total calcium score of 428. LAD= 329; LCx/ OM= 99\par \par Cardiac CT performed on 12/16/2021: Calcium Score = 457. Calcified plaque of the distal left main resulting in 10-15% stenosis. 30-40% nonobstructive plaque in the proximal LAD. Less than 20% stenosis of the distal LAD. Noncalcified plaque at the ostium of the first diagonal resulting in 25-35% stenosis. Small focus of calcified plaque of the proximal LCx resulting in no appreciable stenosis. Calcified plaque of the proximal RCA resulting in less than 10% stenosis. \par

## 2023-06-01 ENCOUNTER — NON-APPOINTMENT (OUTPATIENT)
Age: 58
End: 2023-06-01

## 2023-06-01 ENCOUNTER — APPOINTMENT (OUTPATIENT)
Dept: CARDIOLOGY | Facility: CLINIC | Age: 58
End: 2023-06-01
Payer: COMMERCIAL

## 2023-06-01 VITALS
TEMPERATURE: 97.7 F | RESPIRATION RATE: 14 BRPM | WEIGHT: 173 LBS | SYSTOLIC BLOOD PRESSURE: 123 MMHG | DIASTOLIC BLOOD PRESSURE: 75 MMHG | HEART RATE: 45 BPM | OXYGEN SATURATION: 99 % | BODY MASS INDEX: 23.46 KG/M2

## 2023-06-01 PROCEDURE — 99214 OFFICE O/P EST MOD 30 MIN: CPT | Mod: 25

## 2023-06-01 PROCEDURE — 93000 ELECTROCARDIOGRAM COMPLETE: CPT

## 2023-06-01 PROCEDURE — 93306 TTE W/DOPPLER COMPLETE: CPT

## 2023-06-02 DIAGNOSIS — E87.5 HYPERKALEMIA: ICD-10-CM

## 2023-06-02 LAB
25(OH)D3 SERPL-MCNC: 41.6 NG/ML
CHOLEST SERPL-MCNC: 127 MG/DL
ESTIMATED AVERAGE GLUCOSE: 105 MG/DL
HBA1C MFR BLD HPLC: 5.3 %
HDLC SERPL-MCNC: 58 MG/DL
LDLC SERPL CALC-MCNC: 54 MG/DL
NONHDLC SERPL-MCNC: 69 MG/DL
TRIGL SERPL-MCNC: 77 MG/DL
TSH SERPL-ACNC: 3.43 UIU/ML

## 2023-06-05 LAB
ALBUMIN SERPL ELPH-MCNC: 4.6 G/DL
ALP BLD-CCNC: 65 U/L
ALT SERPL-CCNC: 32 U/L
ANION GAP SERPL CALC-SCNC: 10 MMOL/L
AST SERPL-CCNC: 31 U/L
BILIRUB SERPL-MCNC: 0.6 MG/DL
BUN SERPL-MCNC: 13 MG/DL
CALCIUM SERPL-MCNC: 9.9 MG/DL
CHLORIDE SERPL-SCNC: 105 MMOL/L
CO2 SERPL-SCNC: 28 MMOL/L
CREAT SERPL-MCNC: 0.94 MG/DL
EGFR: 95 ML/MIN/1.73M2
GLUCOSE SERPL-MCNC: 97 MG/DL
POTASSIUM SERPL-SCNC: 5.8 MMOL/L
PROT SERPL-MCNC: 6.8 G/DL
SODIUM SERPL-SCNC: 143 MMOL/L

## 2023-07-04 ENCOUNTER — NON-APPOINTMENT (OUTPATIENT)
Age: 58
End: 2023-07-04

## 2023-07-04 NOTE — DISCUSSION/SUMMARY
[FreeTextEntry1] : IMPRESSION: Mr. WHALEN is a 57 year old man with a family history of CAD, dyslipidemia, and coronary atherosclerosis who presents today for follow up of hyperlipidemia and CAD. \par \par PLAN:\par 1. He will continue on Crestor 10 mg daily and Aspirin 81 mg daily given his CAD.  I will be checking a CMP and lipid profile today. His goal LDL is less than 70. He will also continue on a low fat/ low cholesterol diet. He had nonobstructive coronary artery disease on his cardiac CT that was performed about a year and a half ago. He was bradycardic on his ECG that was performed today, which is relatively unchanged and he is without any symptoms. I will be checking a CMP and lipid profile today. \par 2. He had an echocardiogram today that showed relatively stable mitral and tricuspid regurgitation.\par 3. He will follow up with me in 6 months, or sooner if he is symptomatic. [EKG obtained to assist in diagnosis and management of assessed problem(s)] : EKG obtained to assist in diagnosis and management of assessed problem(s)

## 2023-07-04 NOTE — HISTORY OF PRESENT ILLNESS
[FreeTextEntry1] : Patient is a 57 year old man with a family history of CAD, dyslipidemia, mitral regurgitation, and coronary atherosclerosis who presents today for follow up of coronary atherosclerosis and dyslipidemia.  He states that he has been feeling well denying any chest pain, dyspnea, palpitations, headaches, and dizziness. He states that he exercises on a daily basis and does not experience any exertional symptoms. \par

## 2023-07-04 NOTE — PHYSICAL EXAM
[Well Developed] : well developed [No Acute Distress] : no acute distress [Well Nourished] : well nourished [Normal Conjunctiva] : normal conjunctiva [Normal Venous Pressure] : normal venous pressure [No Carotid Bruit] : no carotid bruit [Bradycardia] : bradycardic [Rhythm Regular] : regular [Normal S1] : normal S1 [Normal S2] : normal S2 [I] : a grade 1 [Clear Lung Fields] : clear lung fields [Good Air Entry] : good air entry [No Respiratory Distress] : no respiratory distress  [Soft] : abdomen soft [Non Tender] : non-tender [Normal Bowel Sounds] : normal bowel sounds [Normal Gait] : normal gait [Gait - Sufficient for Exercise Testing] : gait - sufficient for exercise testing [No Edema] : no edema [No Cyanosis] : no cyanosis [No Rash] : no rash [Moves all extremities] : moves all extremities [No Focal Deficits] : no focal deficits [Normal Speech] : normal speech [Alert and Oriented] : alert and oriented [Normal memory] : normal memory [S3] : no S3 [No Pitting Edema] : no pitting edema present [Right Carotid Bruit] : no bruit heard over the right carotid [Left Carotid Bruit] : no bruit heard over the left carotid [Bruit] : no bruit heard [de-identified] : Extraocular muscles intact. Anicteric sclerae. [de-identified] : No visible skin ulcers. [de-identified] : Normal oral mucosa.

## 2023-07-04 NOTE — CARDIOLOGY SUMMARY
[de-identified] : 6/1/2023: Marked Sinus Bradycardia at 40 beats per minute\par  [de-identified] : Calcium score performed on 9/13/2021: Total calcium score of 428. LAD= 329; LCx/ OM= 99\par \par Cardiac CT performed on 12/16/2021: Calcium Score = 457. Calcified plaque of the distal left main resulting in 10-15% stenosis. 30-40% nonobstructive plaque in the proximal LAD. Less than 20% stenosis of the distal LAD. Noncalcified plaque at the ostium of the first diagonal resulting in 25-35% stenosis. Small focus of calcified plaque of the proximal LCx resulting in no appreciable stenosis. Calcified plaque of the proximal RCA resulting in less than 10% stenosis. \par  [de-identified] : 11/23/2021: EF 55%. 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 Mild-moderate, eccentric anteriorly-directed mitral regurgitation. Mild aortic regurgitation. Mildly dilated left atrium. Normal LV systolic function. MIld right atrial enlargement. RV enlargement with normal RV systolic function. Borderline pulmonary HTN. PASP = 39 mmHg. Moderate tricuspid regurgitation.\par

## 2023-08-29 ENCOUNTER — TRANSCRIPTION ENCOUNTER (OUTPATIENT)
Age: 58
End: 2023-08-29

## 2023-12-04 ENCOUNTER — APPOINTMENT (OUTPATIENT)
Dept: CARDIOLOGY | Facility: CLINIC | Age: 58
End: 2023-12-04
Payer: COMMERCIAL

## 2023-12-04 ENCOUNTER — NON-APPOINTMENT (OUTPATIENT)
Age: 58
End: 2023-12-04

## 2023-12-04 VITALS
WEIGHT: 176 LBS | HEIGHT: 72 IN | RESPIRATION RATE: 12 BRPM | BODY MASS INDEX: 23.84 KG/M2 | HEART RATE: 53 BPM | OXYGEN SATURATION: 99 % | DIASTOLIC BLOOD PRESSURE: 74 MMHG | SYSTOLIC BLOOD PRESSURE: 132 MMHG

## 2023-12-04 PROCEDURE — 99214 OFFICE O/P EST MOD 30 MIN: CPT | Mod: 25

## 2023-12-04 PROCEDURE — 93000 ELECTROCARDIOGRAM COMPLETE: CPT

## 2023-12-07 DIAGNOSIS — R94.31 ABNORMAL ELECTROCARDIOGRAM [ECG] [EKG]: ICD-10-CM

## 2023-12-07 LAB
25(OH)D3 SERPL-MCNC: 62.5 NG/ML
ALBUMIN SERPL ELPH-MCNC: 4.7 G/DL
ALP BLD-CCNC: 57 U/L
ALT SERPL-CCNC: 19 U/L
ANION GAP SERPL CALC-SCNC: 10 MMOL/L
APO A-I SERPL-MCNC: 164 MG/DL
APO A-I/APO B SERPL: 0.3 RATIO
APO B SERPL-MCNC: 48 MG/DL
APO LP(A) SERPL-MCNC: 134 NMOL/L
APPEARANCE: CLEAR
AST SERPL-CCNC: 27 U/L
BASOPHILS # BLD AUTO: 0.03 K/UL
BASOPHILS NFR BLD AUTO: 0.5 %
BILIRUB SERPL-MCNC: 0.7 MG/DL
BILIRUBIN URINE: NEGATIVE
BLOOD URINE: NEGATIVE
BUN SERPL-MCNC: 12 MG/DL
CALCIUM SERPL-MCNC: 9.7 MG/DL
CHLORIDE SERPL-SCNC: 102 MMOL/L
CHOLEST SERPL-MCNC: 117 MG/DL
CO2 SERPL-SCNC: 29 MMOL/L
COLOR: YELLOW
CREAT SERPL-MCNC: 0.92 MG/DL
CREAT SPEC-SCNC: 109 MG/DL
EGFR: 96 ML/MIN/1.73M2
EOSINOPHIL # BLD AUTO: 0.19 K/UL
EOSINOPHIL NFR BLD AUTO: 3.3 %
ESTIMATED AVERAGE GLUCOSE: 105 MG/DL
FOLATE SERPL-MCNC: 6 NG/ML
GLUCOSE QUALITATIVE U: NEGATIVE MG/DL
GLUCOSE SERPL-MCNC: 97 MG/DL
HBA1C MFR BLD HPLC: 5.3 %
HCT VFR BLD CALC: 47 %
HDLC SERPL-MCNC: 57 MG/DL
HGB BLD-MCNC: 15.4 G/DL
IMM GRANULOCYTES NFR BLD AUTO: 0.2 %
KETONES URINE: NEGATIVE MG/DL
LDLC SERPL CALC-MCNC: 39 MG/DL
LEUKOCYTE ESTERASE URINE: NEGATIVE
LYMPHOCYTES # BLD AUTO: 1.39 K/UL
LYMPHOCYTES NFR BLD AUTO: 23.9 %
MAN DIFF?: NORMAL
MCHC RBC-ENTMCNC: 29 PG
MCHC RBC-ENTMCNC: 32.8 GM/DL
MCV RBC AUTO: 88.5 FL
MICROALBUMIN 24H UR DL<=1MG/L-MCNC: <1.2 MG/DL
MICROALBUMIN/CREAT 24H UR-RTO: NORMAL MG/G
MONOCYTES # BLD AUTO: 0.37 K/UL
MONOCYTES NFR BLD AUTO: 6.4 %
NEUTROPHILS # BLD AUTO: 3.82 K/UL
NEUTROPHILS NFR BLD AUTO: 65.7 %
NITRITE URINE: NEGATIVE
NONHDLC SERPL-MCNC: 60 MG/DL
PH URINE: 7
PLATELET # BLD AUTO: 185 K/UL
POTASSIUM SERPL-SCNC: 5 MMOL/L
PROT SERPL-MCNC: 6.9 G/DL
PROTEIN URINE: NEGATIVE MG/DL
PSA SERPL-MCNC: 1.98 NG/ML
RBC # BLD: 5.31 M/UL
RBC # FLD: 12.8 %
SODIUM SERPL-SCNC: 141 MMOL/L
SPECIFIC GRAVITY URINE: 1.02
TRIGL SERPL-MCNC: 121 MG/DL
TSH SERPL-ACNC: 4.03 UIU/ML
UROBILINOGEN URINE: 0.2 MG/DL
VIT B12 SERPL-MCNC: 639 PG/ML
WBC # FLD AUTO: 5.81 K/UL

## 2024-01-11 NOTE — H&P PST ADULT - VASCULAR
"Physical Therapy Treatment    Patient Name:  Dimitri Johnson   MRN:  [id number]  Admit Date: 12/26/2023  Admitting Diagnosis: Weakness  Recent Surgeries: N/A    General Precautions: Standard, fall, aspiration, vision impaired  Orthopedic Precautions: N/A  Braces: N/A    Recommendations:     Discharge Recommendations: home health PT  Level of Assistance Recommended at Discharge: 24 hours light assistance  Discharge Equipment Recommendations: 3-in-1 commode, wheelchair  Barriers to discharge: Decreased caregiver support    Assessment:     Dimitri Johnson is a 89 y.o. male admitted with a medical diagnosis of Weakness .   Pt was agreeable and tolerated session well. Pt tolerated increase ankle weight on seated therex and cross  with no issues today. Pt required cues for sequencing with all activity with notable anxiousness behavior.  Patient continues to demonstrate the need for low intensity therapy on a scheduled basis exhibited by decreased independence with functional mobility.    Performance deficits affecting function: weakness, gait instability, impaired endurance, impaired balance, decreased safety awareness, pain, impaired self care skills, impaired functional mobility, decreased lower extremity function.    Rehab Potential is good    Activity Tolerance: Good    Plan:     Patient to be seen 5 x/week to address the above listed problems via gait training, therapeutic activities, therapeutic exercises, neuromuscular re-education, wheelchair management/training    Plan of Care Expires: 01/26/24  Plan of Care Reviewed with: patient    Subjective     "I use the stair lift".     Pain/Comfort:  Pain Rating 1: 0/10  Pain Rating Post-Intervention 1: 0/10    Patient's cultural, spiritual, Jew conflicts given the current situation:  no    Objective:     Patient found  up in wheelchair in therapy gym  with  (no lines) upon PT entry.     Therapeutic Activities and Exercises:   Seated BLE therex 2x 10 reps: ankle pumps, " "long arc quads, and marches  Completed with 2# ankle weight  Recumbent cross  x10 mins (lvl 1)  To improve BLE endurance, strength, and ROM  Patient educated on role of therapy, goals of session, and benefits of out of bed mobility.   Instructed on use of call button and importance of calling nursing staff for assistance with mobility   Questions/concerns addressed within PTA scope of practice  Pt verbalized understanding.    Functional Mobility:  Transfers:   Sit <> Stand Transfer: minimum assistance and moderate assistance with rolling walker   Cues to scoot forward, hand placement, and keep feet flat on the floor   Trainer <> Chair Transfer: minimum assistance with no assistive device using Stand Pivot technique  Cues for hand placement and sequencing    Gait:  Pt ambulated ~275+100 ft with contact guard assistance and rolling walker. Wheelchair following   1 rest break and no overt LOB  Gait Deviation(s): flexed posture, decrease nena, downward gaze, and B genu valgum   Verbal/tactile cues for gaze direction and upright posture   Curb:  Pt ascended/descended 4" curb step with Rolling Walker with Contact Guard Assistance.   Cue for sequencing     AM-PAC 6 CLICK MOBILITY  17    Patient left up in chair with call button in reach and PCT notified.    GOALS:   Multidisciplinary Problems       Physical Therapy Goals          Problem: Physical Therapy    Goal Priority Disciplines Outcome Goal Variances Interventions   Physical Therapy Goal     PT, PT/OT Ongoing, Progressing     Description: Goals to be met by: 24     Patient will increase functional independence with mobility by performin. Supine to sit with Supervision  2. Sit to supine with Supervision  3. Rolling to Left and Right with Supervision  4. Sit to stand transfer with Stand-by Assistance  5. Bed to chair transfer with Stand-by Assistance using Rolling Walker  6. Gait  x 150 feet with Stand-by Assistance using Rolling Walker  7. " Wheelchair propulsion x 150 feet with Modified [place] using bilateral upper extremities  8. New Goal 1/8/24: Ascend/descend 4 inch curb step using RW with SBA  9. New Goal 1/8/24: Ascend/descend 4 steps using BHR with SBA (progress number of steps as tolerated)    Rehab Services' DME recommendations    Wheelchair  Number of hours up in a wheelchair per day 8      Style Light weight    Justification for light weight w/c: patient cannot propel in a standard wheelchair, patient can and does self-propel in a lightweight wheelchair, patient has impaired ability to participate in MRADLs, mobility limitations cannot be sifficiently resolved with a cane/walker, the home provides adequate access between rooms for a wheelchair, a wheelchair will significantly improve the ability to participate in MRADLs and will be used in the home on a regular basis, the patient is willing to use a wheelchair in the home, the patient has a caregiver who is available, willing, and able to provide assistance with the wheelchair, and the patient requires additional person for safety with mobility with walker  Seat Width 18 (Standard adult)  Seat Depth 18  Back Height Standard  Leg Support Standard, Heel Loops, and Swing Away  Arm Height Desk and Swing Away  Lap Belt Buckle  Accessories Anti-tippers and Safety belt  Cushion Basic  Justification for Cushion Skin Integrity    Transport Chair    3 in 1 commode Standard     Justification for 3 in 1 commode: The patient's deficits will not be sufficiently addressed by a raised toilet seat                           Time Tracking:     PT Received On: 01/11/24  PT Start Time: 0955  PT Stop Time: 1040  PT Total Time (min): 45 min    Billable Minutes: Gait Training 17, Therapeutic Activity 13, and Therapeutic Exercise 15    Treatment Type: Treatment  PT/PTA: PTA     Number of PTA visits since last PT visit: 4     01/11/2024   Equal and normal pulses (carotid, femoral, dorsalis pedis)

## 2024-02-27 NOTE — ED ADULT TRIAGE NOTE - BMI (KG/M2)
Problem: Pain - Adult  Goal: Verbalizes/displays adequate comfort level or baseline comfort level  Outcome: Progressing     Problem: Safety - Adult  Goal: Free from fall injury  Outcome: Progressing   The patient's goals for the shift include Safety, pain relief    The clinical goals for the shift include Maintain pt safety and comfort; monitor labs/vitals/telemetry         24.8

## 2024-03-05 ENCOUNTER — TRANSCRIPTION ENCOUNTER (OUTPATIENT)
Age: 59
End: 2024-03-05

## 2024-05-13 ENCOUNTER — LABORATORY RESULT (OUTPATIENT)
Age: 59
End: 2024-05-13

## 2024-05-13 ENCOUNTER — NON-APPOINTMENT (OUTPATIENT)
Age: 59
End: 2024-05-13

## 2024-05-13 ENCOUNTER — APPOINTMENT (OUTPATIENT)
Dept: CARDIOLOGY | Facility: CLINIC | Age: 59
End: 2024-05-13
Payer: COMMERCIAL

## 2024-05-13 VITALS
HEART RATE: 41 BPM | OXYGEN SATURATION: 99 % | HEIGHT: 72 IN | SYSTOLIC BLOOD PRESSURE: 135 MMHG | BODY MASS INDEX: 25.06 KG/M2 | DIASTOLIC BLOOD PRESSURE: 83 MMHG | WEIGHT: 185 LBS | RESPIRATION RATE: 16 BRPM

## 2024-05-13 DIAGNOSIS — I05.9 RHEUMATIC MITRAL VALVE DISEASE, UNSPECIFIED: ICD-10-CM

## 2024-05-13 DIAGNOSIS — I25.10 ATHEROSCLEROTIC HEART DISEASE OF NATIVE CORONARY ARTERY W/OUT ANGINA PECTORIS: ICD-10-CM

## 2024-05-13 PROCEDURE — G2211 COMPLEX E/M VISIT ADD ON: CPT

## 2024-05-13 PROCEDURE — 93000 ELECTROCARDIOGRAM COMPLETE: CPT

## 2024-05-13 PROCEDURE — 99214 OFFICE O/P EST MOD 30 MIN: CPT

## 2024-05-13 RX ORDER — ROSUVASTATIN CALCIUM 10 MG/1
10 TABLET, FILM COATED ORAL
Qty: 90 | Refills: 1 | Status: ACTIVE | COMMUNITY
Start: 2021-11-16 | End: 1900-01-01

## 2024-05-13 NOTE — DISCUSSION/SUMMARY
[FreeTextEntry1] : IMPRESSION: Mr. WHALEN is a 58 year old man with a family history of CAD, dyslipidemia, and coronary atherosclerosis who presents today for follow up of hyperlipidemia and CAD.   PLAN: 1. He will continue on Crestor 10 mg daily and Aspirin 81 mg daily given his CAD.  I will be checking a CMP and lipid profile today. His goal LDL is less than 70. He will also continue on a low fat/ low cholesterol diet. He had nonobstructive coronary artery disease on his cardiac CT that was performed about 2 and a half years ago. He was bradycardic on his ECG that was performed today, which is relatively unchanged and he is without any symptoms.  We did discuss a follow-up cardiac CT and we have decided to revisit this at the time of his next visit. 2.  I have asked him to schedule a follow-up echocardiogram to follow-up his mitral regurgitation.   3. He will follow up with me in 6 months, or sooner if he is symptomatic. [EKG obtained to assist in diagnosis and management of assessed problem(s)] : EKG obtained to assist in diagnosis and management of assessed problem(s)

## 2024-05-13 NOTE — PHYSICAL EXAM
[Well Developed] : well developed [Well Nourished] : well nourished [No Acute Distress] : no acute distress [Normal Conjunctiva] : normal conjunctiva [Normal Venous Pressure] : normal venous pressure [No Carotid Bruit] : no carotid bruit [Bradycardia] : bradycardic [Rhythm Regular] : regular [Normal S1] : normal S1 [Normal S2] : normal S2 [I] : a grade 1 [No Pitting Edema] : no pitting edema present [Clear Lung Fields] : clear lung fields [Good Air Entry] : good air entry [No Respiratory Distress] : no respiratory distress  [Soft] : abdomen soft [Non Tender] : non-tender [Normal Bowel Sounds] : normal bowel sounds [Normal Gait] : normal gait [Gait - Sufficient for Exercise Testing] : gait - sufficient for exercise testing [No Edema] : no edema [No Cyanosis] : no cyanosis [No Rash] : no rash [Moves all extremities] : moves all extremities [No Focal Deficits] : no focal deficits [Normal Speech] : normal speech [Alert and Oriented] : alert and oriented [Normal memory] : normal memory [S3] : no S3 [Right Carotid Bruit] : no bruit heard over the right carotid [Left Carotid Bruit] : no bruit heard over the left carotid [Bruit] : no bruit heard [de-identified] : Extraocular muscles intact. Anicteric sclerae. [de-identified] : Normal oral mucosa.  [de-identified] : No visible skin ulcers.

## 2024-05-13 NOTE — CARDIOLOGY SUMMARY
[de-identified] : 5/13/2024: Marked Sinus Bradycardia at 40 beats per minute with low voltage in the limb leads.   [de-identified] : 6/1/2023: Low normal left ventricular systolic function. EF is approximately 55%. Concentric left ventricular remodeling. Mildly enlarged right ventricular cavity size and normal right ventricular systolic function. Mild to moderate mitral regurgitation. There is mild calcification of the mitral valve annulus. Thickened mitral valve leaflets. PASP= 31 mmHg. No pulmonary hypertension. Mild-moderate tricuspid regurgitation. Trace aortic regurgitation.  Calcified trileaflet aortic valve with normal opening. The right atrium is mildly dilated in size. The left atrium is moderately dilated in size.   11/23/2021: EF 55%. 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 Mild-moderate, eccentric anteriorly-directed mitral regurgitation. Mild aortic regurgitation. Mildly dilated left atrium. Normal LV systolic function. MIld right atrial enlargement. RV enlargement with normal RV systolic function. Borderline pulmonary HTN. PASP = 39 mmHg. Moderate tricuspid regurgitation.  [de-identified] : Calcium score performed on 9/13/2021: Total calcium score of 428. LAD= 329; LCx/ OM= 99\par  \par  Cardiac CT performed on 12/16/2021: Calcium Score = 457. Calcified plaque of the distal left main resulting in 10-15% stenosis. 30-40% nonobstructive plaque in the proximal LAD. Less than 20% stenosis of the distal LAD. Noncalcified plaque at the ostium of the first diagonal resulting in 25-35% stenosis. Small focus of calcified plaque of the proximal LCx resulting in no appreciable stenosis. Calcified plaque of the proximal RCA resulting in less than 10% stenosis. \par

## 2024-05-13 NOTE — HISTORY OF PRESENT ILLNESS
[FreeTextEntry1] : Patient is a 58 year old man with a family history of CAD, dyslipidemia, mitral regurgitation, and coronary atherosclerosis who presents today for follow up of coronary atherosclerosis and dyslipidemia.  He states that he has been feeling well denying any chest pain, dyspnea, palpitations, headaches, and dizziness. He states that he exercises regularly and does not experience any chest pain or dyspnea when exercising.

## 2024-05-14 LAB
25(OH)D3 SERPL-MCNC: 45.6 NG/ML
BASOPHILS # BLD AUTO: 0.03 K/UL
BASOPHILS NFR BLD AUTO: 0.6 %
CHOLEST SERPL-MCNC: 120 MG/DL
EOSINOPHIL # BLD AUTO: 0.16 K/UL
EOSINOPHIL NFR BLD AUTO: 3.3 %
ESTIMATED AVERAGE GLUCOSE: 108 MG/DL
FOLATE SERPL-MCNC: 12.3 NG/ML
HBA1C MFR BLD HPLC: 5.4 %
HCT VFR BLD CALC: 44.6 %
HDLC SERPL-MCNC: 56 MG/DL
HGB BLD-MCNC: 15 G/DL
IMM GRANULOCYTES NFR BLD AUTO: 0.2 %
LDLC SERPL CALC-MCNC: 48 MG/DL
LYMPHOCYTES # BLD AUTO: 1.43 K/UL
LYMPHOCYTES NFR BLD AUTO: 29.7 %
MAN DIFF?: NORMAL
MCHC RBC-ENTMCNC: 29 PG
MCHC RBC-ENTMCNC: 33.6 GM/DL
MCV RBC AUTO: 86.3 FL
MONOCYTES # BLD AUTO: 0.25 K/UL
MONOCYTES NFR BLD AUTO: 5.2 %
NEUTROPHILS # BLD AUTO: 2.93 K/UL
NEUTROPHILS NFR BLD AUTO: 61 %
NONHDLC SERPL-MCNC: 65 MG/DL
PLATELET # BLD AUTO: 222 K/UL
RBC # BLD: 5.17 M/UL
RBC # FLD: 13.2 %
TRIGL SERPL-MCNC: 84 MG/DL
TSH SERPL-ACNC: 4.4 UIU/ML
VIT B12 SERPL-MCNC: 603 PG/ML
WBC # FLD AUTO: 4.81 K/UL

## 2024-05-15 LAB
ALBUMIN SERPL ELPH-MCNC: 4.5 G/DL
ALP BLD-CCNC: 58 U/L
ALT SERPL-CCNC: 24 U/L
ANION GAP SERPL CALC-SCNC: 12 MMOL/L
AST SERPL-CCNC: 30 U/L
BILIRUB SERPL-MCNC: 0.5 MG/DL
BUN SERPL-MCNC: 17 MG/DL
CALCIUM SERPL-MCNC: 9.3 MG/DL
CHLORIDE SERPL-SCNC: 101 MMOL/L
CO2 SERPL-SCNC: 28 MMOL/L
CREAT SERPL-MCNC: 1.02 MG/DL
EGFR: 85 ML/MIN/1.73M2
GLUCOSE SERPL-MCNC: 68 MG/DL
POTASSIUM SERPL-SCNC: 5.2 MMOL/L
PROT SERPL-MCNC: 6.8 G/DL
SODIUM SERPL-SCNC: 141 MMOL/L

## 2024-05-21 ENCOUNTER — APPOINTMENT (OUTPATIENT)
Dept: ENDOCRINOLOGY | Facility: CLINIC | Age: 59
End: 2024-05-21
Payer: COMMERCIAL

## 2024-05-21 VITALS
OXYGEN SATURATION: 98 % | SYSTOLIC BLOOD PRESSURE: 132 MMHG | BODY MASS INDEX: 25.06 KG/M2 | TEMPERATURE: 97.5 F | WEIGHT: 185 LBS | RESPIRATION RATE: 16 BRPM | HEIGHT: 72 IN | HEART RATE: 42 BPM | DIASTOLIC BLOOD PRESSURE: 70 MMHG

## 2024-05-21 DIAGNOSIS — Z83.79 FAMILY HISTORY OF OTHER DISEASES OF THE DIGESTIVE SYSTEM: ICD-10-CM

## 2024-05-21 DIAGNOSIS — R79.89 OTHER SPECIFIED ABNORMAL FINDINGS OF BLOOD CHEMISTRY: ICD-10-CM

## 2024-05-21 DIAGNOSIS — Z83.3 FAMILY HISTORY OF DIABETES MELLITUS: ICD-10-CM

## 2024-05-21 PROCEDURE — 36415 COLL VENOUS BLD VENIPUNCTURE: CPT

## 2024-05-21 PROCEDURE — 99214 OFFICE O/P EST MOD 30 MIN: CPT

## 2024-05-21 NOTE — ADDENDUM
[FreeTextEntry1] :  By signing my name below, I, Rima Tidwell, attest that this document has been prepared under the direction and in the presence of Dr. Jane.  I, Lars Jane MD, personally performed the services described in this documentation. All medical record entries made by the scribe were at my discretion and in my presence. I have reviewed the chart and discharge instructions (if applicable) and agree that the record reflects my personal permanence and is accurate and complete.

## 2024-05-21 NOTE — HISTORY OF PRESENT ILLNESS
[FreeTextEntry1] : CC: elevated TSH  This is a 58-year-old male with CAD here for a consultation for elevated TSH.  TSH from 5/14/24 was found to be 4.40 on routine bloodwork. Prior TSH from 2021 until December 2023 were normal.  He is not currently on thyroid medications.  TPO antibodies and thyroglobulin antibodies are negative.  No prior history of thyroid disease. There is a strong family history of autoimmune disease including celiac disease, T1DM, and Crohn's disease in his children.  Denies symptoms of hypothyroidism.  He is on rosuvastatin 10 mg daily.  He is on Vitamin D3 5000 IU daily.

## 2024-05-21 NOTE — ASSESSMENT
[FreeTextEntry1] : This is a 58-year-old male with CAD here for a consultation for elevated TSH.  TSH from 5/14/24 was found to be 4.40 on routine bloodwork. Prior TSH from 2021 until December 2023 were normal.  He is not currently on any thyroid medications.  TPO antibodies and thyroglobulin antibodies are negative.  No prior history of thyroid disease. There is a strong family history of autoimmune disease including celiac disease, T1DM, and Crohn's disease in his children.  Denies symptoms of hypothyroidism.  He is on rosuvastatin 10 mg daily.  He is on Vitamin D3 5000 IU daily.  Check repeat TFTs  No current indication for thyroid hormone replacement.  Check thyroid ultrasound to evaluated for thyroid nodules.  He is clinically euthyroid.

## 2024-05-27 LAB
T4 FREE SERPL-MCNC: 1.1 NG/DL
TSH SERPL-ACNC: 3.2 UIU/ML

## 2024-06-14 ENCOUNTER — APPOINTMENT (OUTPATIENT)
Dept: CARDIOLOGY | Facility: CLINIC | Age: 59
End: 2024-06-14
Payer: COMMERCIAL

## 2024-06-14 PROCEDURE — 93306 TTE W/DOPPLER COMPLETE: CPT

## 2024-06-17 LAB
THYROGLOB AB SERPL-ACNC: <20 IU/ML
THYROPEROXIDASE AB SERPL IA-ACNC: <10 IU/ML

## 2024-08-30 ENCOUNTER — TRANSCRIPTION ENCOUNTER (OUTPATIENT)
Age: 59
End: 2024-08-30

## 2024-09-03 ENCOUNTER — TRANSCRIPTION ENCOUNTER (OUTPATIENT)
Age: 59
End: 2024-09-03

## 2024-11-13 ENCOUNTER — APPOINTMENT (OUTPATIENT)
Dept: CARDIOLOGY | Facility: CLINIC | Age: 59
End: 2024-11-13
Payer: COMMERCIAL

## 2024-11-13 ENCOUNTER — NON-APPOINTMENT (OUTPATIENT)
Age: 59
End: 2024-11-13

## 2024-11-13 VITALS
OXYGEN SATURATION: 97 % | HEIGHT: 72 IN | WEIGHT: 185 LBS | BODY MASS INDEX: 25.06 KG/M2 | DIASTOLIC BLOOD PRESSURE: 70 MMHG | SYSTOLIC BLOOD PRESSURE: 120 MMHG | HEART RATE: 43 BPM | TEMPERATURE: 97.3 F | RESPIRATION RATE: 16 BRPM

## 2024-11-13 DIAGNOSIS — I25.10 ATHEROSCLEROTIC HEART DISEASE OF NATIVE CORONARY ARTERY W/OUT ANGINA PECTORIS: ICD-10-CM

## 2024-11-13 DIAGNOSIS — E87.5 HYPERKALEMIA: ICD-10-CM

## 2024-11-13 PROCEDURE — 99214 OFFICE O/P EST MOD 30 MIN: CPT

## 2024-11-13 PROCEDURE — G2211 COMPLEX E/M VISIT ADD ON: CPT | Mod: NC

## 2024-11-13 PROCEDURE — 93000 ELECTROCARDIOGRAM COMPLETE: CPT

## 2024-11-18 LAB
25(OH)D3 SERPL-MCNC: 49.9 NG/ML
ALBUMIN SERPL ELPH-MCNC: 4.4 G/DL
ALP BLD-CCNC: 53 U/L
ALT SERPL-CCNC: 27 U/L
ANION GAP SERPL CALC-SCNC: 11 MMOL/L
APPEARANCE: CLEAR
AST SERPL-CCNC: 32 U/L
BASOPHILS # BLD AUTO: 0.03 K/UL
BASOPHILS NFR BLD AUTO: 0.6 %
BILIRUB SERPL-MCNC: 0.5 MG/DL
BILIRUBIN URINE: NEGATIVE
BLOOD URINE: NEGATIVE
BUN SERPL-MCNC: 18 MG/DL
CALCIUM SERPL-MCNC: 9.6 MG/DL
CHLORIDE SERPL-SCNC: 100 MMOL/L
CHOLEST SERPL-MCNC: 138 MG/DL
CO2 SERPL-SCNC: 28 MMOL/L
COLOR: YELLOW
CREAT SERPL-MCNC: 1.15 MG/DL
EGFR: 73 ML/MIN/1.73M2
EOSINOPHIL # BLD AUTO: 0.09 K/UL
EOSINOPHIL NFR BLD AUTO: 1.7 %
ESTIMATED AVERAGE GLUCOSE: 100 MG/DL
FOLATE SERPL-MCNC: 11.4 NG/ML
GLUCOSE QUALITATIVE U: NEGATIVE MG/DL
GLUCOSE SERPL-MCNC: 80 MG/DL
HBA1C MFR BLD HPLC: 5.1 %
HCT VFR BLD CALC: 45.8 %
HDLC SERPL-MCNC: 55 MG/DL
HGB BLD-MCNC: 14.7 G/DL
IMM GRANULOCYTES NFR BLD AUTO: 0.2 %
KETONES URINE: NEGATIVE MG/DL
LDLC SERPL CALC-MCNC: 71 MG/DL
LEUKOCYTE ESTERASE URINE: NEGATIVE
LYMPHOCYTES # BLD AUTO: 1.88 K/UL
LYMPHOCYTES NFR BLD AUTO: 34.8 %
MAN DIFF?: NORMAL
MCHC RBC-ENTMCNC: 28.4 PG
MCHC RBC-ENTMCNC: 32.1 G/DL
MCV RBC AUTO: 88.6 FL
MONOCYTES # BLD AUTO: 0.29 K/UL
MONOCYTES NFR BLD AUTO: 5.4 %
NEUTROPHILS # BLD AUTO: 3.1 K/UL
NEUTROPHILS NFR BLD AUTO: 57.3 %
NITRITE URINE: NEGATIVE
NONHDLC SERPL-MCNC: 84 MG/DL
PH URINE: 7
PLATELET # BLD AUTO: 205 K/UL
POTASSIUM SERPL-SCNC: 5.5 MMOL/L
PROT SERPL-MCNC: 6.7 G/DL
PROTEIN URINE: NEGATIVE MG/DL
PSA SERPL-MCNC: 2.26 NG/ML
RBC # BLD: 5.17 M/UL
RBC # FLD: 12.9 %
SODIUM SERPL-SCNC: 139 MMOL/L
SPECIFIC GRAVITY URINE: 1.02
TRIGL SERPL-MCNC: 59 MG/DL
TSH SERPL-ACNC: 4.15 UIU/ML
UROBILINOGEN URINE: 0.2 MG/DL
VIT B12 SERPL-MCNC: 557 PG/ML
WBC # FLD AUTO: 5.4 K/UL

## 2025-05-14 ENCOUNTER — APPOINTMENT (OUTPATIENT)
Dept: CARDIOLOGY | Facility: CLINIC | Age: 60
End: 2025-05-14
Payer: COMMERCIAL

## 2025-05-14 ENCOUNTER — LABORATORY RESULT (OUTPATIENT)
Age: 60
End: 2025-05-14

## 2025-05-14 ENCOUNTER — NON-APPOINTMENT (OUTPATIENT)
Age: 60
End: 2025-05-14

## 2025-05-14 VITALS
SYSTOLIC BLOOD PRESSURE: 124 MMHG | WEIGHT: 184 LBS | RESPIRATION RATE: 12 BRPM | BODY MASS INDEX: 24.92 KG/M2 | HEART RATE: 71 BPM | DIASTOLIC BLOOD PRESSURE: 70 MMHG | HEIGHT: 72 IN | OXYGEN SATURATION: 99 %

## 2025-05-14 DIAGNOSIS — I05.9 RHEUMATIC MITRAL VALVE DISEASE, UNSPECIFIED: ICD-10-CM

## 2025-05-14 DIAGNOSIS — I25.10 ATHEROSCLEROTIC HEART DISEASE OF NATIVE CORONARY ARTERY W/OUT ANGINA PECTORIS: ICD-10-CM

## 2025-05-14 PROCEDURE — 99214 OFFICE O/P EST MOD 30 MIN: CPT | Mod: 25

## 2025-05-14 PROCEDURE — 93000 ELECTROCARDIOGRAM COMPLETE: CPT

## 2025-05-15 LAB
25(OH)D3 SERPL-MCNC: 53.9 NG/ML
ALBUMIN SERPL ELPH-MCNC: 4.5 G/DL
ALP BLD-CCNC: 61 U/L
ALT SERPL-CCNC: 47 U/L
ANION GAP SERPL CALC-SCNC: 12 MMOL/L
APO A-I SERPL-MCNC: 133 MG/DL
APO A-I/APO B SERPL: 0.45
APO B SERPL-MCNC: 59 MG/DL
APPEARANCE: CLEAR
AST SERPL-CCNC: 34 U/L
BASOPHILS # BLD AUTO: 0.02 K/UL
BASOPHILS NFR BLD AUTO: 0.4 %
BILIRUB SERPL-MCNC: 0.6 MG/DL
BILIRUBIN URINE: NEGATIVE
BLOOD URINE: NEGATIVE
BUN SERPL-MCNC: 16 MG/DL
CALCIUM SERPL-MCNC: 9.5 MG/DL
CHLORIDE SERPL-SCNC: 101 MMOL/L
CHOLEST SERPL-MCNC: 124 MG/DL
CO2 SERPL-SCNC: 27 MMOL/L
COLOR: YELLOW
CREAT SERPL-MCNC: 1.1 MG/DL
EGFRCR SERPLBLD CKD-EPI 2021: 77 ML/MIN/1.73M2
EOSINOPHIL # BLD AUTO: 0.07 K/UL
EOSINOPHIL NFR BLD AUTO: 1.3 %
ESTIMATED AVERAGE GLUCOSE: 108 MG/DL
FOLATE SERPL-MCNC: 13.3 NG/ML
GLUCOSE QUALITATIVE U: NEGATIVE MG/DL
GLUCOSE SERPL-MCNC: 87 MG/DL
HBA1C MFR BLD HPLC: 5.4 %
HCT VFR BLD CALC: 45.5 %
HDLC SERPL-MCNC: 51 MG/DL
HGB BLD-MCNC: 14.7 G/DL
IMM GRANULOCYTES NFR BLD AUTO: 0.4 %
KETONES URINE: NEGATIVE MG/DL
LDLC SERPL-MCNC: 58 MG/DL
LEUKOCYTE ESTERASE URINE: ABNORMAL
LYMPHOCYTES # BLD AUTO: 1.61 K/UL
LYMPHOCYTES NFR BLD AUTO: 30.1 %
MAN DIFF?: NORMAL
MCHC RBC-ENTMCNC: 28.2 PG
MCHC RBC-ENTMCNC: 32.3 G/DL
MCV RBC AUTO: 87.3 FL
MONOCYTES # BLD AUTO: 0.24 K/UL
MONOCYTES NFR BLD AUTO: 4.5 %
NEUTROPHILS # BLD AUTO: 3.39 K/UL
NEUTROPHILS NFR BLD AUTO: 63.3 %
NITRITE URINE: NEGATIVE
NONHDLC SERPL-MCNC: 73 MG/DL
PH URINE: 7
PLATELET # BLD AUTO: 186 K/UL
POTASSIUM SERPL-SCNC: 5.2 MMOL/L
PROT SERPL-MCNC: 6.8 G/DL
PROTEIN URINE: NEGATIVE MG/DL
RBC # BLD: 5.21 M/UL
RBC # FLD: 13.2 %
SODIUM SERPL-SCNC: 141 MMOL/L
SPECIFIC GRAVITY URINE: 1.02
TRIGL SERPL-MCNC: 70 MG/DL
TSH SERPL-ACNC: 4.47 UIU/ML
UROBILINOGEN URINE: 0.2 MG/DL
VIT B12 SERPL-MCNC: 612 PG/ML
WBC # FLD AUTO: 5.35 K/UL

## 2025-05-16 LAB — APO LP(A) SERPL-MCNC: 113.9 NMOL/L

## 2025-06-02 ENCOUNTER — APPOINTMENT (OUTPATIENT)
Dept: CARDIOLOGY | Facility: CLINIC | Age: 60
End: 2025-06-02
Payer: COMMERCIAL

## 2025-06-02 DIAGNOSIS — R79.89 OTHER SPECIFIED ABNORMAL FINDINGS OF BLOOD CHEMISTRY: ICD-10-CM

## 2025-06-02 PROCEDURE — 93306 TTE W/DOPPLER COMPLETE: CPT

## 2025-06-03 ENCOUNTER — TRANSCRIPTION ENCOUNTER (OUTPATIENT)
Age: 60
End: 2025-06-03

## 2025-06-05 ENCOUNTER — TRANSCRIPTION ENCOUNTER (OUTPATIENT)
Age: 60
End: 2025-06-05

## 2025-09-16 ENCOUNTER — RX RENEWAL (OUTPATIENT)
Age: 60
End: 2025-09-16